# Patient Record
Sex: FEMALE | Race: WHITE | NOT HISPANIC OR LATINO | Employment: OTHER | URBAN - NONMETROPOLITAN AREA
[De-identification: names, ages, dates, MRNs, and addresses within clinical notes are randomized per-mention and may not be internally consistent; named-entity substitution may affect disease eponyms.]

---

## 2020-06-19 PROBLEM — E03.9 HYPOTHYROIDISM: Status: ACTIVE | Noted: 2020-06-19

## 2020-06-19 PROBLEM — G47.33 OSA (OBSTRUCTIVE SLEEP APNEA): Status: ACTIVE | Noted: 2020-06-19

## 2020-06-19 PROBLEM — M19.90 ARTHRITIS: Status: ACTIVE | Noted: 2020-06-19

## 2020-06-19 PROBLEM — R00.2 PALPITATIONS: Status: ACTIVE | Noted: 2020-06-19

## 2020-06-19 PROBLEM — I10 ESSENTIAL HYPERTENSION: Status: ACTIVE | Noted: 2020-06-19

## 2020-06-19 PROBLEM — E78.2 MIXED HYPERLIPIDEMIA: Status: ACTIVE | Noted: 2020-06-19

## 2020-06-19 PROBLEM — R06.02 SHORTNESS OF BREATH: Status: ACTIVE | Noted: 2020-06-19

## 2020-08-03 ENCOUNTER — OFFICE VISIT (OUTPATIENT)
Dept: CARDIOLOGY | Facility: CLINIC | Age: 75
End: 2020-08-03

## 2020-08-03 VITALS
HEART RATE: 75 BPM | DIASTOLIC BLOOD PRESSURE: 70 MMHG | OXYGEN SATURATION: 96 % | WEIGHT: 272 LBS | SYSTOLIC BLOOD PRESSURE: 137 MMHG | BODY MASS INDEX: 41.22 KG/M2 | HEIGHT: 68 IN

## 2020-08-03 DIAGNOSIS — G47.33 OSA (OBSTRUCTIVE SLEEP APNEA): ICD-10-CM

## 2020-08-03 DIAGNOSIS — R00.2 PALPITATIONS: ICD-10-CM

## 2020-08-03 DIAGNOSIS — E78.2 MIXED HYPERLIPIDEMIA: ICD-10-CM

## 2020-08-03 DIAGNOSIS — I10 ESSENTIAL HYPERTENSION: Primary | ICD-10-CM

## 2020-08-03 DIAGNOSIS — R06.02 SHORTNESS OF BREATH: ICD-10-CM

## 2020-08-03 PROCEDURE — 99213 OFFICE O/P EST LOW 20 MIN: CPT | Performed by: SPECIALIST

## 2020-08-03 RX ORDER — ATORVASTATIN CALCIUM 20 MG/1
20 TABLET, FILM COATED ORAL NIGHTLY
COMMUNITY

## 2020-08-03 RX ORDER — LEVOTHYROXINE SODIUM 0.05 MG/1
50 TABLET ORAL DAILY
COMMUNITY
End: 2022-09-19 | Stop reason: DRUGHIGH

## 2020-08-03 RX ORDER — UBIDECARENONE/VIT E/VIT E MIX 100-20-15
CAPSULE ORAL DAILY
COMMUNITY
End: 2022-11-30

## 2020-08-03 NOTE — PROGRESS NOTES
Subjective   Follow up, Hypertension  Snehal Cutler is a 74 y.o. female who presents to day for Establish Care (Here for f/u); Hyperlipidemia; Palpitations; Sleep Apnea; and Hypertension.    CHIEF COMPLIANT  Chief Complaint   Patient presents with   • Establish Care     Here for f/u   • Hyperlipidemia   • Palpitations   • Sleep Apnea   • Hypertension     Problem List:    1. [facility], 12-, EF 80-85%, left dominant, Nl. Coronaries  2. HTN  3. Hyperlipidemia  4. TERI, does not use machine. Followed by Dr. Holder.   5. Palpitations  6. Hypothyroidism        Problem List Items Addressed This Visit        Cardiovascular and Mediastinum    Palpitations    Essential hypertension - Primary    Mixed hyperlipidemia    Relevant Medications    atorvastatin (LIPITOR) 20 MG tablet       Respiratory    Shortness of breath    TERI (obstructive sleep apnea)          HPI  Doing well, but with recently started to have LE edema, mild shortness of breath, no chest pain, no palpitations, she is not using the CPAP                PRIOR MEDS  Current Outpatient Medications on File Prior to Visit   Medication Sig Dispense Refill   • amLODIPine (NORVASC) 10 MG tablet Take 10 mg by mouth Daily.     • atorvastatin (LIPITOR) 20 MG tablet Take 20 mg by mouth Every Night.     • CBD (cannabidiol) oral oil Take  by mouth Daily.     • doxazosin (CARDURA) 4 MG tablet Take 4 mg by mouth Every Night.     • hydroCHLOROthiazide (HYDRODIURIL) 25 MG tablet Take 25 mg by mouth Daily.     • levothyroxine (SYNTHROID, LEVOTHROID) 50 MCG tablet Take 50 mcg by mouth Daily.     • lisinopril (PRINIVIL,ZESTRIL) 40 MG tablet Take 40 mg by mouth Daily.     • meloxicam (MOBIC) 15 MG tablet Take 15 mg by mouth Daily.     • metFORMIN (GLUCOPHAGE) 500 MG tablet Take 500 mg by mouth Daily With Breakfast.     • metoprolol tartrate (LOPRESSOR) 100 MG tablet Take 100 mg by mouth 2 (Two) Times a Day.     • oxybutynin XL (DITROPAN-XL) 5 MG 24 hr tablet Take 5 mg by mouth Daily.      • cholecalciferol (VITAMIN D3) 25 MCG tablet Take  by mouth 2 (Two) Times a Day. Not taken for approx. A mo. Will be restarting     • Co-Enzyme Q10 100 MG capsule Take  by mouth Daily. Not taken for approx. A mo. Will be restarting     • Garlic 1000 MG capsule Take  by mouth Daily. Not taken for approx. A mo. Will be restarting     • MAGNESIUM CITRATE PO Take 250 mg by mouth Daily. Not taken for approx. A mo. Will be restarting     • [DISCONTINUED] Turmeric 500 MG capsule Take  by mouth Daily.       No current facility-administered medications on file prior to visit.        ALLERGIES  Morphine    HISTORY  Past Medical History:   Diagnosis Date   • Arthritis    • HTN (hypertension)    • Hyperlipidemia    • Hypothyroidism    • TERI (obstructive sleep apnea)     does not use machine   • Palpitation    • Shortness of breath        Social History     Socioeconomic History   • Marital status:      Spouse name: Not on file   • Number of children: Not on file   • Years of education: Not on file   • Highest education level: Not on file   Tobacco Use   • Smoking status: Never Smoker   • Smokeless tobacco: Never Used   Substance and Sexual Activity   • Alcohol use: Never     Frequency: Never   • Drug use: Never       Family History   Problem Relation Age of Onset   • Heart attack Mother    • Cancer Father        Review of Systems   Constitutional: Positive for fatigue (r/t inactivity ). Negative for activity change and appetite change.   HENT: Negative.  Negative for congestion.    Eyes: Positive for visual disturbance (glasses prn). Negative for discharge.   Respiratory: Negative.  Negative for apnea, cough, choking, chest tightness, shortness of breath, wheezing and stridor.    Cardiovascular: Positive for palpitations (occas.) and leg swelling. Negative for chest pain.   Gastrointestinal: Negative.  Negative for abdominal distention and abdominal pain.   Endocrine: Negative.  Negative for cold intolerance and heat  "intolerance.   Genitourinary: Negative.  Negative for frequency.   Musculoskeletal: Positive for arthralgias, gait problem (ambulates with rolling walker) and myalgias.   Skin: Negative.  Negative for color change and pallor.   Allergic/Immunologic: Negative.  Negative for immunocompromised state.   Neurological: Negative for facial asymmetry.   Hematological: Negative.  Does not bruise/bleed easily.   Psychiatric/Behavioral: Negative.  Negative for agitation and behavioral problems.       Objective     VITALS: /70 (BP Location: Left arm, Patient Position: Sitting)   Pulse 75   Ht 172.7 cm (68\")   Wt 123 kg (272 lb)   SpO2 96%   BMI 41.36 kg/m²     LABS:   Lab Results (most recent)     None          IMAGING:   No Images in the past 120 days found..    EXAM:  Physical Exam   Constitutional: She is oriented to person, place, and time. She appears well-developed and well-nourished.   HENT:   Head: Normocephalic and atraumatic.   Eyes: Pupils are equal, round, and reactive to light.   Neck: Neck supple. No JVD present. No thyromegaly present.   Cardiovascular: Normal rate, regular rhythm, normal heart sounds and intact distal pulses. Exam reveals no gallop and no friction rub.   No murmur heard.  Pulmonary/Chest: Effort normal and breath sounds normal. No stridor. No respiratory distress. She has no wheezes. She has no rales. She exhibits no tenderness.   Musculoskeletal: She exhibits no edema, tenderness or deformity.   Neurological: She is alert and oriented to person, place, and time. No cranial nerve deficit. Coordination normal.   Skin: Skin is warm and dry.   Psychiatric: She has a normal mood and affect.   Vitals reviewed.      Procedure   Procedures       Assessment/Plan    Diagnosis Plan   1. Essential hypertension     2. Mixed hyperlipidemia     3. Palpitations     4. TERI (obstructive sleep apnea)     5. Shortness of breath     1. Blood pressure is well controlled, will continue current " management  2. No labs, will get lipid prior to next visit  3. No further palpitations  4. New onset edema, and shortness of breath, will get echocardiogram to assess cardiac function  5. Not using CPAP and refuses to use it    No follow-ups on file.    Snehal was seen today for establish care, hyperlipidemia, palpitations, sleep apnea and hypertension.    Diagnoses and all orders for this visit:    Essential hypertension    Mixed hyperlipidemia    Palpitations    TERI (obstructive sleep apnea)    Shortness of breath                 MEDS ORDERED DURING VISIT:  No orders of the defined types were placed in this encounter.        This document has been electronically signed by Nabeel Turner MD  August 3, 2020 14:50

## 2020-09-16 ENCOUNTER — APPOINTMENT (OUTPATIENT)
Dept: CARDIOLOGY | Facility: HOSPITAL | Age: 75
End: 2020-09-16

## 2020-10-05 ENCOUNTER — TELEPHONE (OUTPATIENT)
Dept: CARDIOLOGY | Facility: CLINIC | Age: 75
End: 2020-10-05

## 2020-10-05 NOTE — TELEPHONE ENCOUNTER
PER DR. CASTELLANOS. PCP TO ADDR[facility] REFILLS. [facility] AWARE, TO RELAY TO PATIENT. PH,JUAN CARLOSN

## 2020-10-28 ENCOUNTER — APPOINTMENT (OUTPATIENT)
Dept: CARDIOLOGY | Facility: HOSPITAL | Age: 75
End: 2020-10-28

## 2022-09-19 ENCOUNTER — OFFICE VISIT (OUTPATIENT)
Dept: CARDIOLOGY | Facility: CLINIC | Age: 77
End: 2022-09-19

## 2022-09-19 VITALS
HEIGHT: 67 IN | OXYGEN SATURATION: 98 % | HEART RATE: 76 BPM | WEIGHT: 285.6 LBS | SYSTOLIC BLOOD PRESSURE: 148 MMHG | DIASTOLIC BLOOD PRESSURE: 76 MMHG | BODY MASS INDEX: 44.83 KG/M2

## 2022-09-19 DIAGNOSIS — E78.2 MIXED HYPERLIPIDEMIA: ICD-10-CM

## 2022-09-19 DIAGNOSIS — R00.2 PALPITATIONS: ICD-10-CM

## 2022-09-19 DIAGNOSIS — R60.0 BILATERAL LEG EDEMA: ICD-10-CM

## 2022-09-19 DIAGNOSIS — I10 ESSENTIAL HYPERTENSION: Primary | ICD-10-CM

## 2022-09-19 DIAGNOSIS — G47.33 OSA (OBSTRUCTIVE SLEEP APNEA): ICD-10-CM

## 2022-09-19 DIAGNOSIS — R06.02 SHORTNESS OF BREATH: ICD-10-CM

## 2022-09-19 DIAGNOSIS — R73.03 PREDIABETES: ICD-10-CM

## 2022-09-19 PROCEDURE — 99214 OFFICE O/P EST MOD 30 MIN: CPT | Performed by: SPECIALIST

## 2022-09-19 RX ORDER — LEVOTHYROXINE SODIUM 88 UG/1
TABLET ORAL DAILY
COMMUNITY
Start: 2022-08-24

## 2022-09-19 RX ORDER — HYDROCHLOROTHIAZIDE 50 MG/1
TABLET ORAL DAILY
COMMUNITY
Start: 2022-08-15

## 2022-09-19 NOTE — PROGRESS NOTES
Subjective   Follow up, hypertension  Snehal Cutler is a 76 y.o. female who presents to day for Follow-up (Here for 3 mo. F/u), Hyperlipidemia, Hypertension, Palpitations, Shortness of Breath, and Sleep Apnea.    CHIEF COMPLIANT  Chief Complaint   Patient presents with   • Follow-up     Here for 3 mo. F/u   • Hyperlipidemia   • Hypertension   • Palpitations   • Shortness of Breath   • Sleep Apnea     Problem List:     1. [facility], 12-, EF 80-85%, left dominant, Nl. Coronaries  2. HTN  3. Hyperlipidemia  4. TERI, does not use machine. Followed by Dr. Holder.   5. Palpitations  6. Hypothyroidism    Problem List Items Addressed This Visit        Cardiac and Vasculature    Palpitations    Essential hypertension - Primary    Relevant Medications    hydroCHLOROthiazide (HYDRODIURIL) 50 MG tablet    Mixed hyperlipidemia    Relevant Orders    Lipid Panel    Comprehensive Metabolic Panel       Endocrine and Metabolic    Prediabetes       Pulmonary and Pneumonias    Shortness of breath    Relevant Orders    Adult Transthoracic Echo Complete w/ Color, Spectral and Contrast if necessary per protocol       Sleep    TERI (obstructive sleep apnea)       Symptoms and Signs    Bilateral leg edema    Relevant Orders    Adult Transthoracic Echo Complete w/ Color, Spectral and Contrast if necessary per protocol          HPI    She has been using walker for walking and she is denying significant shortness of breath or chest pain or palpitations she is to have palpitations before but now for many months that have resolved but she does have worsening edema recently she is not using CPAP                PRIOR MEDS  Current Outpatient Medications on File Prior to Visit   Medication Sig Dispense Refill   • amLODIPine (NORVASC) 10 MG tablet Take 10 mg by mouth Daily.     • Ascorbic Acid (VITAMIN C PO) Take  by mouth Daily.     • atorvastatin (LIPITOR) 20 MG tablet Take 20 mg by mouth Every Night.     • cholecalciferol (VITAMIN D3) 25 MCG  tablet Take  by mouth 2 (Two) Times a Day.     • Co-Enzyme Q10 100 MG capsule Take  by mouth Daily.     • doxazosin (CARDURA) 4 MG tablet Take 4 mg by mouth Every Night.     • Garlic 1000 MG capsule Take  by mouth Daily.     • hydroCHLOROthiazide (HYDRODIURIL) 50 MG tablet Daily.     • levothyroxine (SYNTHROID, LEVOTHROID) 88 MCG tablet Daily.     • lisinopril (PRINIVIL,ZESTRIL) 40 MG tablet Take 40 mg by mouth Daily.     • MAGNESIUM CITRATE PO Take 250 mg by mouth Daily. Not taken for approx. A mo. Will be restarting     • meloxicam (MOBIC) 15 MG tablet Take 15 mg by mouth Daily.     • metoprolol tartrate (LOPRESSOR) 100 MG tablet Take 200 mg by mouth 2 (Two) Times a Day.     • Multiple Vitamins-Minerals (ZINC PO) Take  by mouth Daily.     • oxybutynin XL (DITROPAN-XL) 5 MG 24 hr tablet Take 5 mg by mouth Daily.     • [DISCONTINUED] CBD (cannabidiol) oral oil Take  by mouth Daily.     • [DISCONTINUED] hydroCHLOROthiazide (HYDRODIURIL) 25 MG tablet Take 25 mg by mouth Daily.     • [DISCONTINUED] levothyroxine (SYNTHROID, LEVOTHROID) 50 MCG tablet Take 50 mcg by mouth Daily.     • [DISCONTINUED] metFORMIN (GLUCOPHAGE) 500 MG tablet Take 500 mg by mouth Daily With Breakfast.       No current facility-administered medications on file prior to visit.       ALLERGIES  Morphine    HISTORY  Past Medical History:   Diagnosis Date   • Arthritis    • HTN (hypertension)    • Hyperlipidemia    • Hypothyroidism    • TERI (obstructive sleep apnea)     does not use machine   • Palpitation    • Shortness of breath        Social History     Socioeconomic History   • Marital status:    Tobacco Use   • Smoking status: Never Smoker   • Smokeless tobacco: Never Used   Substance and Sexual Activity   • Alcohol use: Never   • Drug use: Never       Family History   Problem Relation Age of Onset   • Heart attack Mother    • Cancer Father        Review of Systems   Constitutional: Negative.    HENT: Negative.    Eyes: Positive for  "visual disturbance (reading glasses).   Respiratory: Negative.    Cardiovascular: Positive for leg swelling. Negative for chest pain and palpitations.   Gastrointestinal: Negative.    Endocrine: Negative.    Genitourinary: Negative.    Musculoskeletal: Positive for arthralgias, gait problem (ambulates with rolling walker) and myalgias.   Skin: Negative.    Allergic/Immunologic: Negative.    Neurological: Negative for dizziness, syncope and light-headedness.   Hematological: Negative.    Psychiatric/Behavioral: Positive for sleep disturbance (off and on).        Depression off and on       Objective     VITALS: /76 (BP Location: Left arm, Patient Position: Sitting)   Pulse 76   Ht 170.2 cm (67\")   Wt 130 kg (285 lb 9.6 oz)   SpO2 98%   BMI 44.73 kg/m²     LABS:   Lab Results (most recent)     None          IMAGING:   No Images in the past 120 days found..    EXAM:  Physical Exam  Vitals reviewed.   Constitutional:       Appearance: She is well-developed.   HENT:      Head: Normocephalic and atraumatic.   Eyes:      Pupils: Pupils are equal, round, and reactive to light.   Neck:      Thyroid: No thyromegaly.      Vascular: No JVD.   Cardiovascular:      Rate and Rhythm: Normal rate and regular rhythm.      Heart sounds: Normal heart sounds. No murmur heard.    No friction rub. No gallop.      Comments: Bilateral leg edema  Pulmonary:      Effort: Pulmonary effort is normal. No respiratory distress.      Breath sounds: Normal breath sounds. No stridor. No wheezing or rales.   Chest:      Chest wall: No tenderness.   Musculoskeletal:         General: No tenderness or deformity.      Cervical back: Neck supple.   Skin:     General: Skin is warm and dry.   Neurological:      Mental Status: She is alert and oriented to person, place, and time.      Cranial Nerves: No cranial nerve deficit.      Coordination: Coordination normal.         Procedure   Procedures       Assessment & Plan     Diagnoses and all orders " for this visit:    1. Essential hypertension (Primary)    2. Mixed hyperlipidemia  -     Lipid Panel; Future  -     Comprehensive Metabolic Panel; Future    3. Palpitations    4. Shortness of breath  -     Adult Transthoracic Echo Complete w/ Color, Spectral and Contrast if necessary per protocol; Future    5. TERI (obstructive sleep apnea)    6. Bilateral leg edema  -     Adult Transthoracic Echo Complete w/ Color, Spectral and Contrast if necessary per protocol; Future    7. Prediabetes    1.  Her blood pressure is elevated here but she insists that her blood pressure at home is well controlled so will continue watching her for now  2.  She still having edema I have not seen her for almost 2 years she did not get an echocardiogram I will get an echocardiogram to assess her cardiac function  3.  I do not have any recent blood work but her labs 2 years ago showed hyperlipidemia as well as elevated hemoglobin A1c consistent with prediabetes I am going to repeat the labs prior to next visit  For patient she does not use a CPAP not rating it    Return in about 4 weeks (around 10/17/2022).      Advance Care Planning   ACP discussion was held with the patient during this visit. Patient has an advance directive (not in EMR), copy requested.             MEDS ORDERED DURING VISIT:  No orders of the defined types were placed in this encounter.      As always, Veronica Kellogg, NARGIS  I appreciate very much the opportunity to participate in the cardiovascular care of your patients. Please do not hesitate to call me with any questions with regards to Snehal Cutler evaluation and management.         This document has been electronically signed by Nabeel Turner MD  September 19, 2022 17:22 EDT

## 2022-10-07 ENCOUNTER — HOSPITAL ENCOUNTER (OUTPATIENT)
Dept: CARDIOLOGY | Facility: HOSPITAL | Age: 77
Discharge: HOME | End: 2022-10-07
Admitting: SPECIALIST

## 2022-10-07 DIAGNOSIS — R60.0 BILATERAL LEG EDEMA: ICD-10-CM

## 2022-10-07 DIAGNOSIS — E78.2 MIXED HYPERLIPIDEMIA: ICD-10-CM

## 2022-10-07 DIAGNOSIS — R06.02 SHORTNESS OF BREATH: ICD-10-CM

## 2022-10-07 PROCEDURE — 93306 TTE W/DOPPLER COMPLETE: CPT

## 2022-10-07 PROCEDURE — 93306 TTE W/DOPPLER COMPLETE: CPT | Performed by: SPECIALIST

## 2022-10-10 LAB
BH CV ECHO MEAS - ACS: 2.11 CM
BH CV ECHO MEAS - AO MAX PG: 7.5 MMHG
BH CV ECHO MEAS - AO MEAN PG: 4.6 MMHG
BH CV ECHO MEAS - AO ROOT DIAM: 3.5 CM
BH CV ECHO MEAS - AO V2 MAX: 137.1 CM/SEC
BH CV ECHO MEAS - AO V2 VTI: 36 CM
BH CV ECHO MEAS - EDV(CUBED): 60.2 ML
BH CV ECHO MEAS - EDV(MOD-SP4): 116 ML
BH CV ECHO MEAS - EF(MOD-SP4): 57.4 %
BH CV ECHO MEAS - EF_3D-VOL: 60 %
BH CV ECHO MEAS - ESV(CUBED): 15.4 ML
BH CV ECHO MEAS - ESV(MOD-SP4): 49.4 ML
BH CV ECHO MEAS - FS: 36.5 %
BH CV ECHO MEAS - IVS/LVPW: 1.23 CM
BH CV ECHO MEAS - IVSD: 1.36 CM
BH CV ECHO MEAS - LA DIMENSION: 4.2 CM
BH CV ECHO MEAS - LAT PEAK E' VEL: 5.9 CM/SEC
BH CV ECHO MEAS - LV DIASTOLIC VOL/BSA (35-75): 49.4 CM2
BH CV ECHO MEAS - LV MASS(C)D: 167.6 GRAMS
BH CV ECHO MEAS - LV SYSTOLIC VOL/BSA (12-30): 21 CM2
BH CV ECHO MEAS - LVIDD: 3.9 CM
BH CV ECHO MEAS - LVIDS: 2.49 CM
BH CV ECHO MEAS - LVOT AREA: 3.1 CM2
BH CV ECHO MEAS - LVOT DIAM: 2 CM
BH CV ECHO MEAS - LVPWD: 1.11 CM
BH CV ECHO MEAS - MED PEAK E' VEL: 6.4 CM/SEC
BH CV ECHO MEAS - MV A MAX VEL: 120 CM/SEC
BH CV ECHO MEAS - MV DEC SLOPE: 381.6 CM/SEC2
BH CV ECHO MEAS - MV E MAX VEL: 102 CM/SEC
BH CV ECHO MEAS - MV E/A: 0.85
BH CV ECHO MEAS - MV MAX PG: 6.2 MMHG
BH CV ECHO MEAS - MV MEAN PG: 3.1 MMHG
BH CV ECHO MEAS - MV P1/2T: 91.6 MSEC
BH CV ECHO MEAS - MV V2 VTI: 43.7 CM
BH CV ECHO MEAS - MVA(P1/2T): 2.4 CM2
BH CV ECHO MEAS - RAP SYSTOLE: 10 MMHG
BH CV ECHO MEAS - RVDD: 3 CM
BH CV ECHO MEAS - RVSP: 31.8 MMHG
BH CV ECHO MEAS - SI(MOD-SP4): 28.3 ML/M2
BH CV ECHO MEAS - SV(MOD-SP4): 66.6 ML
BH CV ECHO MEAS - TR MAX PG: 21.8 MMHG
BH CV ECHO MEAS - TR MAX VEL: 233.5 CM/SEC
BH CV ECHO MEASUREMENTS AVERAGE E/E' RATIO: 16.59
LEFT ATRIUM VOLUME INDEX: 30.5 ML/M2
MAXIMAL PREDICTED HEART RATE: 142 BPM
STRESS TARGET HR: 121 BPM

## 2022-10-12 ENCOUNTER — APPOINTMENT (OUTPATIENT)
Dept: CARDIOLOGY | Facility: HOSPITAL | Age: 77
End: 2022-10-12

## 2022-11-30 ENCOUNTER — OFFICE VISIT (OUTPATIENT)
Dept: CARDIOLOGY | Facility: CLINIC | Age: 77
End: 2022-11-30

## 2022-11-30 VITALS
DIASTOLIC BLOOD PRESSURE: 71 MMHG | SYSTOLIC BLOOD PRESSURE: 145 MMHG | HEIGHT: 67 IN | BODY MASS INDEX: 43.63 KG/M2 | HEART RATE: 69 BPM | WEIGHT: 278 LBS | OXYGEN SATURATION: 96 %

## 2022-11-30 DIAGNOSIS — R60.0 BILATERAL LEG EDEMA: ICD-10-CM

## 2022-11-30 DIAGNOSIS — E78.2 MIXED HYPERLIPIDEMIA: ICD-10-CM

## 2022-11-30 DIAGNOSIS — R06.02 SHORTNESS OF BREATH: ICD-10-CM

## 2022-11-30 DIAGNOSIS — G47.33 OSA (OBSTRUCTIVE SLEEP APNEA): ICD-10-CM

## 2022-11-30 DIAGNOSIS — R00.2 PALPITATIONS: ICD-10-CM

## 2022-11-30 DIAGNOSIS — I10 ESSENTIAL HYPERTENSION: Primary | ICD-10-CM

## 2022-11-30 PROCEDURE — 99214 OFFICE O/P EST MOD 30 MIN: CPT | Performed by: SPECIALIST

## 2022-11-30 NOTE — PROGRESS NOTES
Subjective   Follow up, hypertension  Snehal Cutler is a 76 y.o. female who presents to day for Follow-up (Here for 2  mo. F/u), Hyperlipidemia, Hypertension, Palpitations, Shortness of Breath, and Leg Swelling.    CHIEF COMPLIANT  Chief Complaint   Patient presents with   • Follow-up     Here for 2  mo. F/u   • Hyperlipidemia   • Hypertension   • Palpitations   • Shortness of Breath   • Leg Swelling     Problem List:     1. [facility], 12-, EF 80-85%, left dominant, Nl. Coronaries  2. HTN  3. Hyperlipidemia  4. TERI, does not use machine. Followed by Dr. Holder.   5. Palpitations  6. Hypothyroidism    Problem List Items Addressed This Visit        Cardiac and Vasculature    Palpitations    Essential hypertension - Primary    Mixed hyperlipidemia    Relevant Orders    Lipid Panel    Comprehensive Metabolic Panel       Pulmonary and Pneumonias    Shortness of breath       Sleep    TERI (obstructive sleep apnea)       Symptoms and Signs    Bilateral leg edema       HPI    She has been doing well she rarely gets palpitations specially if she is dehydrated otherwise she is denying any chest pain no shortness of breath she does have a mild lower extremity edema recently she has been craving for salt otherwise no new issues                  PRIOR MEDS  Current Outpatient Medications on File Prior to Visit   Medication Sig Dispense Refill   • amLODIPine (NORVASC) 10 MG tablet Take 10 mg by mouth Daily.     • Ascorbic Acid (VITAMIN C PO) Take  by mouth Daily.     • atorvastatin (LIPITOR) 20 MG tablet Take 20 mg by mouth Every Night.     • cholecalciferol (VITAMIN D3) 25 MCG tablet Take  by mouth 3 (Three) Times a Day.     • doxazosin (CARDURA) 4 MG tablet Take 4 mg by mouth Every Night.     • Garlic 1000 MG capsule Take  by mouth Daily.     • hydroCHLOROthiazide (HYDRODIURIL) 50 MG tablet Daily.     • levothyroxine (SYNTHROID, LEVOTHROID) 88 MCG tablet Daily.     • lisinopril (PRINIVIL,ZESTRIL) 40 MG tablet Take 40 mg by  "mouth Daily.     • MAGNESIUM CITRATE PO Take 250 mg by mouth Daily. Not taken for approx. A mo. Will be restarting     • meloxicam (MOBIC) 15 MG tablet Take 15 mg by mouth Daily.     • metoprolol tartrate (LOPRESSOR) 100 MG tablet Take 200 mg by mouth 2 (Two) Times a Day.     • Multiple Vitamins-Minerals (ZINC PO) Take  by mouth Daily.     • oxybutynin XL (DITROPAN-XL) 5 MG 24 hr tablet Take 5 mg by mouth Daily.     • [DISCONTINUED] Co-Enzyme Q10 100 MG capsule Take  by mouth Daily.       No current facility-administered medications on file prior to visit.       ALLERGIES  Morphine    HISTORY  Past Medical History:   Diagnosis Date   • Arthritis    • HTN (hypertension)    • Hyperlipidemia    • Hypothyroidism    • TERI (obstructive sleep apnea)     does not use machine   • Palpitation    • Shortness of breath        Social History     Socioeconomic History   • Marital status:    Tobacco Use   • Smoking status: Never   • Smokeless tobacco: Never   Substance and Sexual Activity   • Alcohol use: Never   • Drug use: Never       Family History   Problem Relation Age of Onset   • Heart attack Mother    • Cancer Father        Review of Systems   Constitutional: Negative.    HENT: Negative.    Eyes: Positive for visual disturbance (reading glasses).   Respiratory: Negative.    Cardiovascular: Positive for palpitations (with dehydration) and leg swelling. Negative for chest pain.   Gastrointestinal: Negative.    Endocrine: Negative.    Genitourinary: Negative.    Musculoskeletal: Positive for arthralgias, gait problem (ambulates with rolling walker) and myalgias.   Skin: Negative.    Allergic/Immunologic: Negative.    Neurological: Negative for dizziness, syncope and light-headedness.   Hematological: Negative.    Psychiatric/Behavioral: Positive for sleep disturbance.       Objective     VITALS: /71 (BP Location: Left arm, Patient Position: Sitting)   Pulse 69   Ht 170.2 cm (67.01\")   Wt 126 kg (278 lb)   " SpO2 96%   BMI 43.53 kg/m²     LABS:   Lab Results (most recent)     None          IMAGING:   No Images in the past 120 days found..    EXAM:  Physical Exam  Vitals reviewed.   Constitutional:       Appearance: She is well-developed.   HENT:      Head: Normocephalic and atraumatic.   Eyes:      Pupils: Pupils are equal, round, and reactive to light.   Neck:      Thyroid: No thyromegaly.      Vascular: No JVD.   Cardiovascular:      Rate and Rhythm: Normal rate and regular rhythm.      Heart sounds: Normal heart sounds. No murmur heard.    No friction rub. No gallop.      Comments: Trace edema  Pulmonary:      Effort: Pulmonary effort is normal. No respiratory distress.      Breath sounds: Normal breath sounds. No stridor. No wheezing or rales.   Chest:      Chest wall: No tenderness.   Musculoskeletal:         General: No tenderness or deformity.      Cervical back: Neck supple.   Skin:     General: Skin is warm and dry.   Neurological:      Mental Status: She is alert and oriented to person, place, and time.      Cranial Nerves: No cranial nerve deficit.      Coordination: Coordination normal.         Procedure   Procedures       Assessment & Plan     Diagnoses and all orders for this visit:    1. Essential hypertension (Primary)    2. Mixed hyperlipidemia  -     Lipid Panel; Future  -     Comprehensive Metabolic Panel; Future    3. Palpitations    4. Shortness of breath    5. TERI (obstructive sleep apnea)    6. Bilateral leg edema    1.  Her blood pressure is a touch high but she said that her blood pressure at home is well controlled about 130 or so systolic so we will continue current medications for now  2.  We discussed the results of the echocardiogram which showed normal systolic function but with diastolic dysfunction I advised her to eat a low-salt diet  3.  Unfortunately I do not have any lab work at all we will try to get labs prior to the next visit  4.  She could not rate the CPAP and she is not using  it  5.  We will try to get lipids myself before she comes next visit we will continue for now the atorvastatin  6.  Regarding the palpitation if is getting any worse we will do an arrhythmia work-up    Return in about 6 months (around 5/30/2023).      Advance Care Planning   ACP discussion was held with the patient during this visit. Patient does not have an advance directive, declines further assistance.             MEDS ORDERED DURING VISIT:  No orders of the defined types were placed in this encounter.      As always, Veronica Kellogg, APRN  I appreciate very much the opportunity to participate in the cardiovascular care of your patients. Please do not hesitate to call me with any questions with regards to Snehal Cutler evaluation and management.         This document has been electronically signed by Nabeel Turner MD  November 30, 2022 16:06 EST

## 2023-05-08 ENCOUNTER — TELEPHONE (OUTPATIENT)
Dept: CARDIOLOGY | Facility: CLINIC | Age: 78
End: 2023-05-08
Payer: MEDICARE

## 2023-05-08 NOTE — TELEPHONE ENCOUNTER
Caller: [facility]    Relationship: Provider    Best call back number: [phone]    What is the best time to reach you: ANYTIME    What was the call regarding:   FESTUS CRUZ [facility] WAS JUST CALLING IN TO SEE IF WE RECEIVED [facility] FROM THEIR OFFICE. SHE WAS NOT SURE THEY WE EVER SENT SO SHE SENT IT OVER ON 5/8/23 JUST TO BE SURE, SHE JUST WANTED TO MAKE OFFICE AWARE.      Do you require a callback: IF NEEDED

## 2023-05-23 ENCOUNTER — TELEPHONE (OUTPATIENT)
Dept: CARDIOLOGY | Facility: CLINIC | Age: 78
End: 2023-05-23
Payer: MEDICARE

## 2023-05-23 NOTE — TELEPHONE ENCOUNTER
Called pt and advised her that they are in her chart and he will go over them with her at her f/up. She expressed understanding.

## 2023-05-23 NOTE — TELEPHONE ENCOUNTER
Caller: Snehal Cutler    Relationship: Self    Best call back number: [phone]    What is the best time to reach you: ANY    Who are you requesting to speak with (clinical staff, provider,  specific staff member): CLINICAL    What was the call regarding: PT WAS CHAY CASTELLANOS EVER REC'D HER LABS FROM PCP JOHN RODRIGUEZ    Do you require a callback: YES

## 2023-06-16 ENCOUNTER — OFFICE VISIT (OUTPATIENT)
Dept: CARDIOLOGY | Facility: CLINIC | Age: 78
End: 2023-06-16
Payer: MEDICARE

## 2023-06-16 VITALS
OXYGEN SATURATION: 99 % | BODY MASS INDEX: 43.07 KG/M2 | HEART RATE: 69 BPM | SYSTOLIC BLOOD PRESSURE: 133 MMHG | HEIGHT: 67 IN | DIASTOLIC BLOOD PRESSURE: 67 MMHG | WEIGHT: 274.4 LBS

## 2023-06-16 DIAGNOSIS — R06.02 SHORTNESS OF BREATH: ICD-10-CM

## 2023-06-16 DIAGNOSIS — R73.03 PREDIABETES: ICD-10-CM

## 2023-06-16 DIAGNOSIS — I10 ESSENTIAL HYPERTENSION: Primary | ICD-10-CM

## 2023-06-16 DIAGNOSIS — R00.2 PALPITATIONS: ICD-10-CM

## 2023-06-16 DIAGNOSIS — G47.33 OSA (OBSTRUCTIVE SLEEP APNEA): ICD-10-CM

## 2023-06-16 DIAGNOSIS — R60.0 BILATERAL LEG EDEMA: ICD-10-CM

## 2023-06-16 DIAGNOSIS — E78.2 MIXED HYPERLIPIDEMIA: ICD-10-CM

## 2023-06-16 PROCEDURE — 3078F DIAST BP <80 MM HG: CPT | Performed by: SPECIALIST

## 2023-06-16 PROCEDURE — 3075F SYST BP GE 130 - 139MM HG: CPT | Performed by: SPECIALIST

## 2023-06-16 PROCEDURE — 99214 OFFICE O/P EST MOD 30 MIN: CPT | Performed by: SPECIALIST

## 2023-06-16 RX ORDER — AMLODIPINE BESYLATE 10 MG/1
10 TABLET ORAL DAILY
Qty: 90 TABLET | Refills: 1 | Status: SHIPPED | OUTPATIENT
Start: 2023-06-16

## 2023-06-16 RX ORDER — LISINOPRIL 40 MG/1
40 TABLET ORAL DAILY
Qty: 90 TABLET | Refills: 1 | Status: SHIPPED | OUTPATIENT
Start: 2023-06-16

## 2023-06-16 RX ORDER — HYDROCHLOROTHIAZIDE 50 MG/1
50 TABLET ORAL DAILY
Qty: 90 TABLET | Refills: 1 | Status: SHIPPED | OUTPATIENT
Start: 2023-06-16

## 2023-06-16 RX ORDER — METOPROLOL TARTRATE 100 MG/1
200 TABLET ORAL 2 TIMES DAILY
Qty: 180 TABLET | Refills: 1 | Status: SHIPPED | OUTPATIENT
Start: 2023-06-16

## 2023-06-16 RX ORDER — FUROSEMIDE 20 MG/1
20 TABLET ORAL DAILY
COMMUNITY
Start: 2023-05-03

## 2023-06-16 RX ORDER — ATORVASTATIN CALCIUM 40 MG/1
40 TABLET, FILM COATED ORAL NIGHTLY
Qty: 90 TABLET | Refills: 1 | Status: SHIPPED | OUTPATIENT
Start: 2023-06-16

## 2023-06-16 NOTE — PROGRESS NOTES
Subjective   Follow up, HTN  Snehal Cutler is a 77 y.o. female who presents to day for Follow-up (Here for 6 mo. F/u), Hyperlipidemia, Hypertension, Palpitations, and Sleep Apnea.    CHIEF COMPLIANT  Chief Complaint   Patient presents with    Follow-up     Here for 6 mo. F/u    Hyperlipidemia    Hypertension    Palpitations    Sleep Apnea        Problem List:     1. [facility], 12-, EF 80-85%, left dominant, Nl. Coronaries  2. HTN  3. Hyperlipidemia  4. TERI, does not use machine. Followed by Dr. Holder.   5. Palpitations  6. Hypothyroidism  Problem List Items Addressed This Visit          Cardiac and Vasculature    Palpitations    Essential hypertension - Primary    Relevant Medications    furosemide (LASIX) 20 MG tablet    amLODIPine (NORVASC) 10 MG tablet    hydroCHLOROthiazide (HYDRODIURIL) 50 MG tablet    lisinopril (PRINIVIL,ZESTRIL) 40 MG tablet    metoprolol tartrate (LOPRESSOR) 100 MG tablet    Mixed hyperlipidemia    Relevant Medications    atorvastatin (LIPITOR) 40 MG tablet    Other Relevant Orders    Lipid Panel    Comprehensive Metabolic Panel       Endocrine and Metabolic    Prediabetes    Relevant Orders    Hemoglobin A1c       Pulmonary and Pneumonias    Shortness of breath       Sleep    TERI (obstructive sleep apnea)       Symptoms and Signs    Bilateral leg edema       HPI  Been having trouble with both of her knees with arthritis and this makes her rather sedentary she is contemplating about seeing an orthopedic physician otherwise from a heart standpoint she has been doing fine with no significant shortness of breath no chest pain very rare palpitations she does have some mild lower extremity swelling and recently she is some diuretics with good results                    PRIOR MEDS  Current Outpatient Medications on File Prior to Visit   Medication Sig Dispense Refill    doxazosin (CARDURA) 4 MG tablet Take 1 tablet by mouth Every Night.      furosemide (LASIX) 20 MG tablet Take 1 tablet by  mouth Daily.      levothyroxine (SYNTHROID, LEVOTHROID) 88 MCG tablet Daily.      meloxicam (MOBIC) 15 MG tablet Take 1 tablet by mouth Daily.      oxybutynin XL (DITROPAN-XL) 5 MG 24 hr tablet Take 1 tablet by mouth Daily.      [DISCONTINUED] amLODIPine (NORVASC) 10 MG tablet Take 1 tablet by mouth Daily.      [DISCONTINUED] atorvastatin (LIPITOR) 20 MG tablet Take 1 tablet by mouth Every Night.      [DISCONTINUED] hydroCHLOROthiazide (HYDRODIURIL) 50 MG tablet Daily.      [DISCONTINUED] lisinopril (PRINIVIL,ZESTRIL) 40 MG tablet Take 1 tablet by mouth Daily.      [DISCONTINUED] metoprolol tartrate (LOPRESSOR) 100 MG tablet Take 2 tablets by mouth 2 (Two) Times a Day.      [DISCONTINUED] Ascorbic Acid (VITAMIN C PO) Take  by mouth Daily.      [DISCONTINUED] cholecalciferol (VITAMIN D3) 25 MCG tablet Take  by mouth 3 (Three) Times a Day.      [DISCONTINUED] Garlic 1000 MG capsule Take  by mouth Daily.      [DISCONTINUED] MAGNESIUM CITRATE PO Take 250 mg by mouth Daily. Not taken for approx. A mo. Will be restarting      [DISCONTINUED] Multiple Vitamins-Minerals (ZINC PO) Take  by mouth Daily.       No current facility-administered medications on file prior to visit.       ALLERGIES  Morphine    HISTORY  Past Medical History:   Diagnosis Date    Arthritis     HTN (hypertension)     Hyperlipidemia     Hypothyroidism     TERI (obstructive sleep apnea)     does not use machine    Palpitation     Shortness of breath        Social History     Socioeconomic History    Marital status:    Tobacco Use    Smoking status: Never    Smokeless tobacco: Never   Substance and Sexual Activity    Alcohol use: Never    Drug use: Never       Family History   Problem Relation Age of Onset    Heart attack Mother     Cancer Father        Review of Systems   Constitutional:  Positive for fatigue.   HENT: Negative.     Eyes:  Positive for visual disturbance (glasses prn).   Respiratory: Negative.     Cardiovascular:  Positive for  "palpitations (occas.) and leg swelling. Negative for chest pain.   Gastrointestinal: Negative.    Endocrine: Negative.    Genitourinary: Negative.    Musculoskeletal:  Positive for arthralgias, gait problem (ambulates with rolling walker) and myalgias.   Skin: Negative.    Allergic/Immunologic: Negative.    Neurological:  Negative for dizziness, syncope and light-headedness.   Hematological: Negative.    Psychiatric/Behavioral:  Positive for sleep disturbance.      Objective     VITALS: /67 (BP Location: Left arm, Patient Position: Sitting)   Pulse 69   Ht 170.2 cm (67.01\")   Wt 124 kg (274 lb 6.4 oz)   SpO2 99%   BMI 42.97 kg/m²     LABS:   Lab Results (most recent)       None            IMAGING:   No Images in the past 120 days found..    EXAM:  Physical Exam  Vitals reviewed.   Constitutional:       Appearance: She is well-developed.   HENT:      Head: Normocephalic and atraumatic.   Eyes:      Pupils: Pupils are equal, round, and reactive to light.   Neck:      Thyroid: No thyromegaly.      Vascular: No JVD.   Cardiovascular:      Rate and Rhythm: Normal rate and regular rhythm.      Heart sounds: Normal heart sounds. No murmur heard.    No friction rub. No gallop.   Pulmonary:      Effort: Pulmonary effort is normal. No respiratory distress.      Breath sounds: Normal breath sounds. No stridor. No wheezing or rales.   Chest:      Chest wall: No tenderness.   Musculoskeletal:         General: No tenderness or deformity.      Cervical back: Neck supple.   Skin:     General: Skin is warm and dry.   Neurological:      Mental Status: She is alert and oriented to person, place, and time.      Cranial Nerves: No cranial nerve deficit.      Coordination: Coordination normal.       Procedure   Procedures       Assessment & Plan     Diagnoses and all orders for this visit:    1. Essential hypertension (Primary)  -     amLODIPine (NORVASC) 10 MG tablet; Take 1 tablet by mouth Daily.  Dispense: 90 tablet; " Refill: 1  -     hydroCHLOROthiazide (HYDRODIURIL) 50 MG tablet; Take 1 tablet by mouth Daily.  Dispense: 90 tablet; Refill: 1  -     lisinopril (PRINIVIL,ZESTRIL) 40 MG tablet; Take 1 tablet by mouth Daily.  Dispense: 90 tablet; Refill: 1  -     metoprolol tartrate (LOPRESSOR) 100 MG tablet; Take 2 tablets by mouth 2 (Two) Times a Day.  Dispense: 180 tablet; Refill: 1    2. Mixed hyperlipidemia  -     atorvastatin (LIPITOR) 40 MG tablet; Take 1 tablet by mouth Every Night.  Dispense: 90 tablet; Refill: 1  -     Lipid Panel; Future  -     Comprehensive Metabolic Panel; Future    3. Palpitations    4. Shortness of breath    5. TERI (obstructive sleep apnea)    6. Bilateral leg edema    7. Prediabetes  -     Hemoglobin A1c; Future    1.  Her blood pressure is well controlled today we will continue with the current management  2.  We discussed the blood work and she has mildly elevated fasting glucose as well as elevated hemoglobin A1c consistent with prediabetes her LDL is a little bit suboptimal of 88 otherwise the rest of the labs were unremarkable going to increase the dose of atorvastatin to 40 mg/day being to try to get an optimal LDL also advised her to talk to her primary care physician possibly considering semaglutide  3.  She has very rare palpitations continue current management  She is having some edema mostly stasis edema there is no clinical volume overload advised her to keep her feet up when she is sitting and watching salt intake  4.  Again she could not tolerate CPAP and she is not using it    Return in about 6 months (around 12/16/2023).      Advance Care Planning   ACP discussion was held with the patient during this visit. Patient does not have an advance directive, declines further assistance.             MEDS ORDERED DURING VISIT:  New Medications Ordered This Visit   Medications    amLODIPine (NORVASC) 10 MG tablet     Sig: Take 1 tablet by mouth Daily.     Dispense:  90 tablet     Refill:  1     atorvastatin (LIPITOR) 40 MG tablet     Sig: Take 1 tablet by mouth Every Night.     Dispense:  90 tablet     Refill:  1    hydroCHLOROthiazide (HYDRODIURIL) 50 MG tablet     Sig: Take 1 tablet by mouth Daily.     Dispense:  90 tablet     Refill:  1    lisinopril (PRINIVIL,ZESTRIL) 40 MG tablet     Sig: Take 1 tablet by mouth Daily.     Dispense:  90 tablet     Refill:  1    metoprolol tartrate (LOPRESSOR) 100 MG tablet     Sig: Take 2 tablets by mouth 2 (Two) Times a Day.     Dispense:  180 tablet     Refill:  1       As always, Veronica Kellogg APRN  I appreciate very much the opportunity to participate in the cardiovascular care of your patients. Please do not hesitate to call me with any questions with regards to Snehal Cutler evaluation and management.         This document has been electronically signed by Nabeel Turner MD  June 16, 2023 11:01 EDT    This note is dictated utilizing voice recognition software.

## 2024-03-12 DIAGNOSIS — I10 ESSENTIAL HYPERTENSION: ICD-10-CM

## 2024-03-12 RX ORDER — HYDROCHLOROTHIAZIDE 50 MG/1
50 TABLET ORAL DAILY
Qty: 30 TABLET | Refills: 1 | Status: SHIPPED | OUTPATIENT
Start: 2024-03-12

## 2024-04-19 ENCOUNTER — OFFICE VISIT (OUTPATIENT)
Dept: CARDIOLOGY | Facility: CLINIC | Age: 79
End: 2024-04-19
Payer: MEDICARE

## 2024-04-19 VITALS
WEIGHT: 270.6 LBS | BODY MASS INDEX: 42.47 KG/M2 | SYSTOLIC BLOOD PRESSURE: 133 MMHG | DIASTOLIC BLOOD PRESSURE: 72 MMHG | HEIGHT: 67 IN | OXYGEN SATURATION: 97 % | HEART RATE: 66 BPM

## 2024-04-19 DIAGNOSIS — E78.2 MIXED HYPERLIPIDEMIA: ICD-10-CM

## 2024-04-19 DIAGNOSIS — G47.33 OSA (OBSTRUCTIVE SLEEP APNEA): ICD-10-CM

## 2024-04-19 DIAGNOSIS — R06.02 SHORTNESS OF BREATH: ICD-10-CM

## 2024-04-19 DIAGNOSIS — R00.2 PALPITATIONS: ICD-10-CM

## 2024-04-19 DIAGNOSIS — R60.0 BILATERAL LEG EDEMA: ICD-10-CM

## 2024-04-19 DIAGNOSIS — I10 ESSENTIAL HYPERTENSION: Primary | ICD-10-CM

## 2024-04-19 DIAGNOSIS — R73.03 PREDIABETES: ICD-10-CM

## 2024-04-19 PROCEDURE — 99214 OFFICE O/P EST MOD 30 MIN: CPT | Performed by: SPECIALIST

## 2024-04-19 PROCEDURE — 3078F DIAST BP <80 MM HG: CPT | Performed by: SPECIALIST

## 2024-04-19 PROCEDURE — 3075F SYST BP GE 130 - 139MM HG: CPT | Performed by: SPECIALIST

## 2024-04-19 RX ORDER — ATORVASTATIN CALCIUM 20 MG/1
1 TABLET, FILM COATED ORAL DAILY
COMMUNITY
End: 2024-04-19 | Stop reason: SDUPTHER

## 2024-04-19 RX ORDER — LISINOPRIL 40 MG/1
40 TABLET ORAL DAILY
Qty: 90 TABLET | Refills: 1 | Status: SHIPPED | OUTPATIENT
Start: 2024-04-19

## 2024-04-19 RX ORDER — ATORVASTATIN CALCIUM 20 MG/1
20 TABLET, FILM COATED ORAL DAILY
Qty: 90 TABLET | Refills: 1 | Status: SHIPPED | OUTPATIENT
Start: 2024-04-19

## 2024-04-19 RX ORDER — FUROSEMIDE 20 MG/1
20 TABLET ORAL DAILY
Qty: 90 TABLET | Refills: 1 | Status: SHIPPED | OUTPATIENT
Start: 2024-04-19

## 2024-04-19 RX ORDER — METOPROLOL TARTRATE 100 MG/1
200 TABLET ORAL 2 TIMES DAILY
Qty: 180 TABLET | Refills: 1 | Status: SHIPPED | OUTPATIENT
Start: 2024-04-19

## 2024-04-19 RX ORDER — AMLODIPINE BESYLATE 10 MG/1
10 TABLET ORAL DAILY
Qty: 90 TABLET | Refills: 1 | Status: SHIPPED | OUTPATIENT
Start: 2024-04-19

## 2024-04-19 NOTE — PROGRESS NOTES
Subjective   Follow up, HTN  Snehal Cutler is a 78 y.o. female who presents to day for Follow-up (Here for 6 mo. F/u), Hyperlipidemia, Hypertension, Palpitations, and Sleep Apnea.    CHIEF COMPLIANT  Chief Complaint   Patient presents with    Follow-up     Here for 6 mo. F/u    Hyperlipidemia    Hypertension    Palpitations    Sleep Apnea     Problem List:     1. [facility], 12-, EF 80-85%, left dominant, Nl. Coronaries  2. HTN  3. Hyperlipidemia  4. TERI, does not use machine. Followed by Dr. Holder.   5. Palpitations  6. Hypothyroidism    Problem List Items Addressed This Visit          Cardiac and Vasculature    Palpitations    Essential hypertension - Primary    Relevant Medications    amLODIPine (NORVASC) 10 MG tablet    furosemide (LASIX) 20 MG tablet    lisinopril (PRINIVIL,ZESTRIL) 40 MG tablet    metoprolol tartrate (LOPRESSOR) 100 MG tablet    Mixed hyperlipidemia    Relevant Medications    atorvastatin (LIPITOR) 20 MG tablet    Other Relevant Orders    Lipid Panel    Comprehensive Metabolic Panel       Endocrine and Metabolic    Prediabetes    Relevant Orders    Hemoglobin A1c       Pulmonary and Pneumonias    Shortness of breath       Sleep    TERI (obstructive sleep apnea)       Symptoms and Signs    Bilateral leg edema       HPI    She has been doing pretty well she is denying any new symptoms no chest pain, no shortness of breath no palpitations at all, she said that she has been taking here diuretics only intermittently and when the weather gets little bit warmer now she gets a little bit of more lower extremity edema so she says she is going to start taking the more regularly                    PRIOR MEDS  Current Outpatient Medications on File Prior to Visit   Medication Sig Dispense Refill    Cholecalciferol (VITAMIN D-3 PO) Take  by mouth Daily.      CO ENZYME Q-10 PO Take  by mouth Daily.      doxazosin (CARDURA) 4 MG tablet Take 1 tablet by mouth Every Night.      GARLIC PO Take  by mouth Daily.       levothyroxine (SYNTHROID, LEVOTHROID) 88 MCG tablet Daily.      MAGNESIUM PO Take  by mouth Daily.      meloxicam (MOBIC) 15 MG tablet Take 1 tablet by mouth Daily.      Multiple Vitamins-Minerals (ZINC PO) Take  by mouth Daily.      Omega-3 Fatty Acids (OMEGA 3 PO) Take  by mouth Daily.      oxybutynin XL (DITROPAN-XL) 5 MG 24 hr tablet Take 1 tablet by mouth 2 (Two) Times a Day.      [DISCONTINUED] amLODIPine (NORVASC) 10 MG tablet Take 1 tablet by mouth Daily. 90 tablet 1    [DISCONTINUED] atorvastatin (LIPITOR) 20 MG tablet Take 1 tablet by mouth Daily.      [DISCONTINUED] lisinopril (PRINIVIL,ZESTRIL) 40 MG tablet Take 1 tablet by mouth Daily. 90 tablet 1    [DISCONTINUED] metoprolol tartrate (LOPRESSOR) 100 MG tablet Take 2 tablets by mouth 2 (Two) Times a Day. 180 tablet 1    [DISCONTINUED] atorvastatin (LIPITOR) 40 MG tablet Take 1 tablet by mouth Every Night. 90 tablet 1    [DISCONTINUED] furosemide (LASIX) 20 MG tablet Take 1 tablet by mouth Daily. (Patient not taking: Reported on 4/19/2024)      [DISCONTINUED] hydroCHLOROthiazide 50 MG tablet TAKE 1 TABLET DAILY (Patient not taking: Reported on 4/19/2024) 30 tablet 1     No current facility-administered medications on file prior to visit.       ALLERGIES  Morphine    HISTORY  Past Medical History:   Diagnosis Date    Arthritis     HTN (hypertension)     Hyperlipidemia     Hypothyroidism     TERI (obstructive sleep apnea)     does not use machine    Palpitation     Shortness of breath        Social History     Socioeconomic History    Marital status:    Tobacco Use    Smoking status: Never    Smokeless tobacco: Never   Substance and Sexual Activity    Alcohol use: Never    Drug use: Never       Family History   Problem Relation Age of Onset    Heart attack Mother     Cancer Father        Review of Systems   Constitutional: Negative.    HENT: Negative.     Eyes:  Positive for visual disturbance (reading glasses).   Respiratory: Negative.    "  Cardiovascular:  Positive for leg swelling. Negative for chest pain and palpitations.   Gastrointestinal: Negative.  Positive for constipation.   Endocrine: Negative.    Genitourinary: Negative.    Musculoskeletal:  Positive for arthralgias, gait problem (ambulates with rolling walker) and myalgias.   Skin: Negative.    Allergic/Immunologic: Negative.    Neurological: Negative.    Hematological: Negative.    Psychiatric/Behavioral: Negative.         Objective     VITALS: /72 (BP Location: Left arm, Patient Position: Sitting)   Pulse 66   Ht 170.2 cm (67.01\")   Wt 123 kg (270 lb 9.6 oz)   SpO2 97%   BMI 42.37 kg/m²     LABS:   Lab Results (most recent)       None            IMAGING:   No Images in the past 120 days found..    EXAM:  Physical Exam  Cardiovascular:      Comments: Trace lower extremity edema        Procedure   Procedures        Assessment & Plan     Diagnoses and all orders for this visit:    1. Essential hypertension (Primary)  -     amLODIPine (NORVASC) 10 MG tablet; Take 1 tablet by mouth Daily.  Dispense: 90 tablet; Refill: 1  -     furosemide (LASIX) 20 MG tablet; Take 1 tablet by mouth Daily.  Dispense: 90 tablet; Refill: 1  -     lisinopril (PRINIVIL,ZESTRIL) 40 MG tablet; Take 1 tablet by mouth Daily.  Dispense: 90 tablet; Refill: 1  -     metoprolol tartrate (LOPRESSOR) 100 MG tablet; Take 2 tablets by mouth 2 (Two) Times a Day.  Dispense: 180 tablet; Refill: 1    2. Mixed hyperlipidemia  -     atorvastatin (LIPITOR) 20 MG tablet; Take 1 tablet by mouth Daily.  Dispense: 90 tablet; Refill: 1  -     Lipid Panel; Future  -     Comprehensive Metabolic Panel; Future    3. Palpitations    4. Shortness of breath    5. TERI (obstructive sleep apnea)    6. Prediabetes  -     Hemoglobin A1c; Future    7. Bilateral leg edema    1.  Her blood pressure is well-controlled we will continue the current management  2.  I reviewed the labs from 4 March from her primary care physician that showed " elevated elevated hemoglobin A1c 5.9 with fasting glucose of 103 otherwise CMP and CBC both with were unremarkable we discussed also the result of the lipid profile which showed LDL of 81 HDL 44 and triglycerides 138 thyroid functions unremarkable we talked about cutting down the carbohydrate intake and exercising  3.  She had no recent palpitations at all  4.  At her level of activity she denies any shortness of breath we will continue current management  5.  She could never tolerate the CPAP and she is not using it  6.  She has slight lower extremity edema now she is not going to use of the diuretics more often    Return in about 6 months (around 10/19/2024).      Advance Care Planning   ACP discussion was held with the patient during this visit. Patient does not have an advance directive, declines further assistance.             MEDS ORDERED DURING VISIT:  New Medications Ordered This Visit   Medications    amLODIPine (NORVASC) 10 MG tablet     Sig: Take 1 tablet by mouth Daily.     Dispense:  90 tablet     Refill:  1    atorvastatin (LIPITOR) 20 MG tablet     Sig: Take 1 tablet by mouth Daily.     Dispense:  90 tablet     Refill:  1    furosemide (LASIX) 20 MG tablet     Sig: Take 1 tablet by mouth Daily.     Dispense:  90 tablet     Refill:  1    lisinopril (PRINIVIL,ZESTRIL) 40 MG tablet     Sig: Take 1 tablet by mouth Daily.     Dispense:  90 tablet     Refill:  1    metoprolol tartrate (LOPRESSOR) 100 MG tablet     Sig: Take 2 tablets by mouth 2 (Two) Times a Day.     Dispense:  180 tablet     Refill:  1       As always, Veronica Kellogg, NARGIS  I appreciate very much the opportunity to participate in the cardiovascular care of your patients. Please do not hesitate to call me with any questions with regards to Snehal Cutler evaluation and management.         This document has been electronically signed by Nabeel Turner MD  April 19, 2024 10:40 EDT    This note is dictated utilizing voice recognition software.

## 2024-05-14 DIAGNOSIS — I10 ESSENTIAL HYPERTENSION: ICD-10-CM

## 2024-05-14 RX ORDER — LISINOPRIL 40 MG/1
40 TABLET ORAL DAILY
Qty: 30 TABLET | Refills: 1 | Status: SHIPPED | OUTPATIENT
Start: 2024-05-14

## 2024-06-11 DIAGNOSIS — I10 ESSENTIAL HYPERTENSION: ICD-10-CM

## 2024-06-11 RX ORDER — HYDROCHLOROTHIAZIDE 50 MG/1
50 TABLET ORAL DAILY
Qty: 30 TABLET | Refills: 1 | OUTPATIENT
Start: 2024-06-11

## 2024-07-10 DIAGNOSIS — I10 ESSENTIAL HYPERTENSION: ICD-10-CM

## 2024-07-10 RX ORDER — LISINOPRIL 40 MG/1
40 TABLET ORAL DAILY
Qty: 30 TABLET | Refills: 1 | Status: SHIPPED | OUTPATIENT
Start: 2024-07-10

## 2024-09-06 DIAGNOSIS — I10 ESSENTIAL HYPERTENSION: ICD-10-CM

## 2024-09-06 RX ORDER — METOPROLOL TARTRATE 100 MG
TABLET ORAL
Qty: 360 TABLET | Refills: 1 | Status: SHIPPED | OUTPATIENT
Start: 2024-09-06

## 2024-09-06 RX ORDER — LISINOPRIL 40 MG/1
40 TABLET ORAL DAILY
Qty: 90 TABLET | Refills: 1 | Status: SHIPPED | OUTPATIENT
Start: 2024-09-06

## 2024-09-06 NOTE — TELEPHONE ENCOUNTER
Name from pharmacy: LISINOPRIL 40MG TABLET 40 Tablet         Will file in chart as: lisinopril (PRINIVIL,ZESTRIL) 40 MG tablet    Sig: TAKE 1 TABLET ONCE DAILY    Disp: 30 tablet    Refills: 1    Start: 9/6/2024    Class: Normal    For: Essential hypertension    Last ordered: 1 month ago (7/10/2024) by Nabeel Turner MD    Last refill: 9/6/2024    Rx #: [id number]    ACE Inhibitors Protocol [id number]/2024 01:48 PM   Protocol Details Normal serum potassium in past 12 months    Most recent eGFR is above 30 in the past 12 months.    No active pregnancy on record    Blood pressure on record    No positive pregnancy test in past 12 months    Patient is not on Entresto    Patient is not on an ARB    Recent or future visit with authorizing provider

## 2024-11-01 ENCOUNTER — OFFICE VISIT (OUTPATIENT)
Dept: CARDIOLOGY | Facility: CLINIC | Age: 79
End: 2024-11-01
Payer: MEDICARE

## 2024-11-01 VITALS
SYSTOLIC BLOOD PRESSURE: 158 MMHG | DIASTOLIC BLOOD PRESSURE: 71 MMHG | HEIGHT: 67 IN | HEART RATE: 68 BPM | OXYGEN SATURATION: 96 % | WEIGHT: 269.6 LBS | BODY MASS INDEX: 42.31 KG/M2

## 2024-11-01 DIAGNOSIS — E78.2 MIXED HYPERLIPIDEMIA: ICD-10-CM

## 2024-11-01 DIAGNOSIS — R00.2 PALPITATIONS: ICD-10-CM

## 2024-11-01 DIAGNOSIS — R06.02 SHORTNESS OF BREATH: ICD-10-CM

## 2024-11-01 DIAGNOSIS — I10 ESSENTIAL HYPERTENSION: Primary | ICD-10-CM

## 2024-11-01 DIAGNOSIS — G47.33 OSA (OBSTRUCTIVE SLEEP APNEA): ICD-10-CM

## 2024-11-01 PROCEDURE — 3078F DIAST BP <80 MM HG: CPT | Performed by: SPECIALIST

## 2024-11-01 PROCEDURE — 3077F SYST BP >= 140 MM HG: CPT | Performed by: SPECIALIST

## 2024-11-01 PROCEDURE — 99214 OFFICE O/P EST MOD 30 MIN: CPT | Performed by: SPECIALIST

## 2024-11-01 RX ORDER — METOPROLOL TARTRATE 100 MG/1
100 TABLET ORAL 2 TIMES DAILY
Qty: 360 TABLET | Refills: 1 | Status: SHIPPED | OUTPATIENT
Start: 2024-11-01

## 2024-11-01 RX ORDER — HYDROCHLOROTHIAZIDE 25 MG/1
25 TABLET ORAL DAILY
Qty: 90 TABLET | Refills: 3 | Status: SHIPPED | OUTPATIENT
Start: 2024-11-01

## 2024-11-01 RX ORDER — ATORVASTATIN CALCIUM 20 MG/1
20 TABLET, FILM COATED ORAL DAILY
Qty: 90 TABLET | Refills: 1 | Status: SHIPPED | OUTPATIENT
Start: 2024-11-01

## 2024-11-01 RX ORDER — PREDNISOLONE ACETATE 10 MG/ML
SUSPENSION/ DROPS OPHTHALMIC
COMMUNITY
Start: 2024-10-01

## 2024-11-01 RX ORDER — LISINOPRIL 40 MG/1
40 TABLET ORAL DAILY
Qty: 90 TABLET | Refills: 1 | Status: SHIPPED | OUTPATIENT
Start: 2024-11-01

## 2024-11-01 RX ORDER — AMLODIPINE BESYLATE 10 MG/1
10 TABLET ORAL DAILY
Qty: 90 TABLET | Refills: 1 | Status: SHIPPED | OUTPATIENT
Start: 2024-11-01

## 2024-11-01 NOTE — PROGRESS NOTES
Subjective   Follow up, HTN  Snehal Cutler is a 78 y.o. female who presents to day for Follow-up (Here for 6 mo. F/u), Hyperlipidemia, Hypertension, Palpitations, Sleep Apnea, and Shortness of Breath.    CHIEF COMPLIANT  Chief Complaint   Patient presents with    Follow-up     Here for 6 mo. F/u    Hyperlipidemia    Hypertension    Palpitations    Sleep Apnea    Shortness of Breath     Problem List:     1. [facility], 12-, EF 80-85%, left dominant, Nl. Coronaries  2. HTN  3. Hyperlipidemia  4. TERI, does not use machine. Followed by Dr. Holder.   5. Palpitations  6. Hypothyroidism    Problem List Items Addressed This Visit          Cardiac and Vasculature    Palpitations    Essential hypertension - Primary    Relevant Medications    hydroCHLOROthiazide 25 MG tablet    amLODIPine (NORVASC) 10 MG tablet    lisinopril (PRINIVIL,ZESTRIL) 40 MG tablet    metoprolol tartrate (LOPRESSOR) 100 MG tablet    Mixed hyperlipidemia    Relevant Medications    atorvastatin (LIPITOR) 20 MG tablet    Other Relevant Orders    Lipid Panel    Comprehensive Metabolic Panel       Pulmonary and Pneumonias    Shortness of breath       Sleep    TERI (obstructive sleep apnea)       HPI  She has been doing fine except she has been elevated under stress recently, recently she had cataract surgery and during the surgery it was noted that her blood pressure is a little bit elevated, otherwise she has been doing fine with no chest pain no shortness of breath no palpitations she gets intermittent edema but once whenever she gets edema she takes an extra water pill and usually edema is gone                      PRIOR MEDS  Current Outpatient Medications on File Prior to Visit   Medication Sig Dispense Refill    doxazosin (CARDURA) 4 MG tablet Take 1 tablet by mouth Every Night.      levothyroxine (SYNTHROID, LEVOTHROID) 88 MCG tablet Daily.      meloxicam (MOBIC) 15 MG tablet Take 1 tablet by mouth Daily.      oxybutynin XL (DITROPAN-XL) 5 MG 24 hr  tablet Take 1 tablet by mouth 2 (Two) Times a Day.      prednisoLONE acetate (PRED FORTE) 1 % ophthalmic suspension AFTER SURGERY USE 1 DROP EVERY 2 HOURS TIL BED. DAY 2 USE 1 DROP 4 TIMES A DAY FOR 14 DAYS, THEN 2 TIMES A DAY FOR 14      [DISCONTINUED] amLODIPine (NORVASC) 10 MG tablet Take 1 tablet by mouth Daily. 90 tablet 1    [DISCONTINUED] atorvastatin (LIPITOR) 20 MG tablet Take 1 tablet by mouth Daily. 90 tablet 1    [DISCONTINUED] furosemide (LASIX) 20 MG tablet Take 1 tablet by mouth Daily. (Patient taking differently: Take 1 tablet by mouth Daily As Needed.) 90 tablet 1    [DISCONTINUED] lisinopril (PRINIVIL,ZESTRIL) 40 MG tablet TAKE 1 TABLET ONCE DAILY 90 tablet 1    [DISCONTINUED] metoprolol tartrate (LOPRESSOR) 100 MG tablet TAKE 2 TABLET(S) TWICE DAILY 360 tablet 1    [DISCONTINUED] Cholecalciferol (VITAMIN D-3 PO) Take  by mouth Daily.      [DISCONTINUED] CO ENZYME Q-10 PO Take  by mouth Daily.      [DISCONTINUED] GARLIC PO Take  by mouth Daily.      [DISCONTINUED] MAGNESIUM PO Take  by mouth Daily.      [DISCONTINUED] Multiple Vitamins-Minerals (ZINC PO) Take  by mouth Daily.      [DISCONTINUED] Omega-3 Fatty Acids (OMEGA 3 PO) Take  by mouth Daily.       No current facility-administered medications on file prior to visit.       ALLERGIES  Morphine    HISTORY  Past Medical History:   Diagnosis Date    Arthritis     HTN (hypertension)     Hyperlipidemia     Hypothyroidism     TERI (obstructive sleep apnea)     does not use machine    Palpitation     Shortness of breath        Social History     Socioeconomic History    Marital status:    Tobacco Use    Smoking status: Never    Smokeless tobacco: Never   Substance and Sexual Activity    Alcohol use: Never    Drug use: Never       Family History   Problem Relation Age of Onset    Heart attack Mother     Cancer Father        Review of Systems   Constitutional:  Positive for fatigue (easily).   HENT: Negative.     Eyes:  Positive for visual  "disturbance.   Respiratory: Negative.     Cardiovascular:  Positive for leg swelling (mildly occas.). Negative for chest pain and palpitations.   Gastrointestinal: Negative.    Endocrine: Negative.    Genitourinary: Negative.    Musculoskeletal:  Positive for arthralgias, gait problem (ambulates with rolling walker) and myalgias.   Skin: Negative.    Allergic/Immunologic: Negative.    Neurological:  Negative for dizziness, syncope and light-headedness.   Hematological: Negative.    Psychiatric/Behavioral: Negative.         Objective     VITALS: /71 (BP Location: Right arm, Patient Position: Sitting)   Pulse 68   Ht 170.2 cm (67.01\")   Wt 122 kg (269 lb 9.6 oz)   SpO2 96%   BMI 42.22 kg/m²     LABS:   Lab Results (most recent)       None            IMAGING:   No Images in the past 120 days found..    EXAM:  Physical Exam  Vitals reviewed.   Constitutional:       Appearance: She is well-developed.   HENT:      Head: Normocephalic and atraumatic.   Eyes:      Pupils: Pupils are equal, round, and reactive to light.   Neck:      Thyroid: No thyromegaly.      Vascular: No JVD.   Cardiovascular:      Rate and Rhythm: Normal rate and regular rhythm.      Heart sounds: Normal heart sounds. No murmur heard.     No friction rub. No gallop.   Pulmonary:      Effort: Pulmonary effort is normal. No respiratory distress.      Breath sounds: Normal breath sounds. No stridor. No wheezing or rales.   Chest:      Chest wall: No tenderness.   Musculoskeletal:         General: No tenderness or deformity.      Cervical back: Neck supple.   Skin:     General: Skin is warm and dry.   Neurological:      Mental Status: She is alert and oriented to person, place, and time.      Cranial Nerves: No cranial nerve deficit.      Coordination: Coordination normal.         Procedure   Procedures        Assessment & Plan     Diagnoses and all orders for this visit:    1. Essential hypertension (Primary)  -     amLODIPine (NORVASC) 10 MG " tablet; Take 1 tablet by mouth Daily.  Dispense: 90 tablet; Refill: 1  -     lisinopril (PRINIVIL,ZESTRIL) 40 MG tablet; Take 1 tablet by mouth Daily.  Dispense: 90 tablet; Refill: 1  -     metoprolol tartrate (LOPRESSOR) 100 MG tablet; Take 1 tablet by mouth 2 (Two) Times a Day.  Dispense: 360 tablet; Refill: 1    2. Mixed hyperlipidemia  -     atorvastatin (LIPITOR) 20 MG tablet; Take 1 tablet by mouth Daily.  Dispense: 90 tablet; Refill: 1  -     Lipid Panel; Future  -     Comprehensive Metabolic Panel; Future    3. Palpitations    4. Shortness of breath    5. TERI (obstructive sleep apnea)    Other orders  -     hydroCHLOROthiazide 25 MG tablet; Take 1 tablet by mouth Daily.  Dispense: 90 tablet; Refill: 3    1.  Her blood pressure is elevated as she is taking only the furosemide as a as needed medications I am going to stop this I am going to add HCTZ 25 mg daily hopefully this will help with the edema as well as the blood pressure  2.  Unfortunate do not have any recent blood work I will try to get labs prior to her next visit  3.  Will continue with the statin for now  4.  No recent shortness of breath  5.  No recent palpitations  6.  She could not tolerate the CPAP and she has never been able to use it    Return in about 3 months (around 2/1/2025).      Advance Care Planning   ACP discussion was held with the patient during this visit. Patient does not have an advance directive, declines further assistance.             MEDS ORDERED DURING VISIT:  New Medications Ordered This Visit   Medications    hydroCHLOROthiazide 25 MG tablet     Sig: Take 1 tablet by mouth Daily.     Dispense:  90 tablet     Refill:  3    amLODIPine (NORVASC) 10 MG tablet     Sig: Take 1 tablet by mouth Daily.     Dispense:  90 tablet     Refill:  1    atorvastatin (LIPITOR) 20 MG tablet     Sig: Take 1 tablet by mouth Daily.     Dispense:  90 tablet     Refill:  1    lisinopril (PRINIVIL,ZESTRIL) 40 MG tablet     Sig: Take 1 tablet by  mouth Daily.     Dispense:  90 tablet     Refill:  1    metoprolol tartrate (LOPRESSOR) 100 MG tablet     Sig: Take 1 tablet by mouth 2 (Two) Times a Day.     Dispense:  360 tablet     Refill:  1       As always, Veronica Kellogg APRN  I appreciate very much the opportunity to participate in the cardiovascular care of your patients. Please do not hesitate to call me with any questions with regards to Snehal Cutler evaluation and management.         This document has been electronically signed by Nabeel Turner MD  November 1, 2024 09:48 EDT    This note is dictated utilizing voice recognition software.

## 2024-11-04 ENCOUNTER — LAB (OUTPATIENT)
Dept: LAB | Facility: HOSPITAL | Age: 79
End: 2024-11-04
Payer: MEDICARE

## 2024-11-04 DIAGNOSIS — E78.2 MIXED HYPERLIPIDEMIA: ICD-10-CM

## 2024-11-04 DIAGNOSIS — R73.03 PREDIABETES: ICD-10-CM

## 2024-11-04 LAB
ALBUMIN SERPL-MCNC: 4.3 G/DL (ref 3.5–5.2)
ALBUMIN/GLOB SERPL: 1.2 G/DL
ALP SERPL-CCNC: 120 U/L (ref 39–117)
ALT SERPL W P-5'-P-CCNC: 19 U/L (ref 1–33)
ANION GAP SERPL CALCULATED.3IONS-SCNC: 11.2 MMOL/L (ref 5–15)
AST SERPL-CCNC: 23 U/L (ref 1–32)
BILIRUB SERPL-MCNC: 0.6 MG/DL (ref 0–1.2)
BUN SERPL-MCNC: 19 MG/DL (ref 8–23)
BUN/CREAT SERPL: 25 (ref 7–25)
CALCIUM SPEC-SCNC: 9.5 MG/DL (ref 8.6–10.5)
CHLORIDE SERPL-SCNC: 101 MMOL/L (ref 98–107)
CHOLEST SERPL-MCNC: 170 MG/DL (ref 0–200)
CO2 SERPL-SCNC: 27.8 MMOL/L (ref 22–29)
CREAT SERPL-MCNC: 0.76 MG/DL (ref 0.57–1)
EGFRCR SERPLBLD CKD-EPI 2021: 79.3 ML/MIN/1.73
GLOBULIN UR ELPH-MCNC: 3.5 GM/DL
GLUCOSE SERPL-MCNC: 107 MG/DL (ref 65–99)
HBA1C MFR BLD: 5.9 % (ref 4.8–5.6)
HDLC SERPL-MCNC: 48 MG/DL (ref 40–60)
LDLC SERPL CALC-MCNC: 99 MG/DL (ref 0–100)
LDLC/HDLC SERPL: 2.01 {RATIO}
POTASSIUM SERPL-SCNC: 4.5 MMOL/L (ref 3.5–5.2)
PROT SERPL-MCNC: 7.8 G/DL (ref 6–8.5)
SODIUM SERPL-SCNC: 140 MMOL/L (ref 136–145)
TRIGL SERPL-MCNC: 128 MG/DL (ref 0–150)
VLDLC SERPL-MCNC: 23 MG/DL (ref 5–40)

## 2024-11-04 PROCEDURE — 80053 COMPREHEN METABOLIC PANEL: CPT

## 2024-11-04 PROCEDURE — 36415 COLL VENOUS BLD VENIPUNCTURE: CPT

## 2024-11-04 PROCEDURE — 80061 LIPID PANEL: CPT

## 2024-11-04 PROCEDURE — 83036 HEMOGLOBIN GLYCOSYLATED A1C: CPT

## 2024-11-06 ENCOUNTER — TELEPHONE (OUTPATIENT)
Dept: CARDIOLOGY | Facility: CLINIC | Age: 79
End: 2024-11-06
Payer: MEDICARE

## 2024-11-06 DIAGNOSIS — I10 ESSENTIAL HYPERTENSION: ICD-10-CM

## 2024-11-06 RX ORDER — HYDROCHLOROTHIAZIDE 25 MG/1
25 TABLET ORAL DAILY
Qty: 90 TABLET | Refills: 3 | Status: SHIPPED | OUTPATIENT
Start: 2024-11-06

## 2024-11-06 RX ORDER — AMLODIPINE BESYLATE 10 MG/1
10 TABLET ORAL DAILY
Qty: 30 TABLET | Refills: 1 | Status: SHIPPED | OUTPATIENT
Start: 2024-11-06

## 2024-12-06 DIAGNOSIS — E78.2 MIXED HYPERLIPIDEMIA: ICD-10-CM

## 2024-12-06 RX ORDER — ATORVASTATIN CALCIUM 20 MG/1
20 TABLET, FILM COATED ORAL DAILY
Qty: 90 TABLET | Refills: 3 | Status: SHIPPED | OUTPATIENT
Start: 2024-12-06

## 2025-01-06 DIAGNOSIS — I10 ESSENTIAL HYPERTENSION: ICD-10-CM

## 2025-01-06 RX ORDER — AMLODIPINE BESYLATE 10 MG/1
10 TABLET ORAL DAILY
Qty: 90 TABLET | Refills: 3 | Status: SHIPPED | OUTPATIENT
Start: 2025-01-06

## 2025-02-24 ENCOUNTER — OFFICE VISIT (OUTPATIENT)
Dept: CARDIOLOGY | Facility: CLINIC | Age: 80
End: 2025-02-24
Payer: MEDICARE

## 2025-02-24 VITALS
HEART RATE: 72 BPM | BODY MASS INDEX: 42.53 KG/M2 | SYSTOLIC BLOOD PRESSURE: 140 MMHG | DIASTOLIC BLOOD PRESSURE: 80 MMHG | WEIGHT: 271 LBS | OXYGEN SATURATION: 99 % | HEIGHT: 67 IN

## 2025-02-24 DIAGNOSIS — R06.02 SHORTNESS OF BREATH: ICD-10-CM

## 2025-02-24 DIAGNOSIS — R73.03 PREDIABETES: ICD-10-CM

## 2025-02-24 DIAGNOSIS — E78.2 MIXED HYPERLIPIDEMIA: ICD-10-CM

## 2025-02-24 DIAGNOSIS — G47.33 OSA (OBSTRUCTIVE SLEEP APNEA): ICD-10-CM

## 2025-02-24 DIAGNOSIS — I10 ESSENTIAL HYPERTENSION: Primary | ICD-10-CM

## 2025-02-24 DIAGNOSIS — R00.2 PALPITATIONS: ICD-10-CM

## 2025-02-24 PROCEDURE — 3077F SYST BP >= 140 MM HG: CPT | Performed by: SPECIALIST

## 2025-02-24 PROCEDURE — 3079F DIAST BP 80-89 MM HG: CPT | Performed by: SPECIALIST

## 2025-02-24 PROCEDURE — 99214 OFFICE O/P EST MOD 30 MIN: CPT | Performed by: SPECIALIST

## 2025-02-24 RX ORDER — ATORVASTATIN CALCIUM 20 MG/1
20 TABLET, FILM COATED ORAL DAILY
Qty: 90 TABLET | Refills: 3 | Status: SHIPPED | OUTPATIENT
Start: 2025-02-24

## 2025-02-24 RX ORDER — AMLODIPINE BESYLATE 10 MG/1
10 TABLET ORAL DAILY
Qty: 90 TABLET | Refills: 3 | Status: SHIPPED | OUTPATIENT
Start: 2025-02-24

## 2025-02-24 RX ORDER — HYDROCHLOROTHIAZIDE 25 MG/1
25 TABLET ORAL DAILY
Qty: 90 TABLET | Refills: 3 | Status: SHIPPED | OUTPATIENT
Start: 2025-02-24

## 2025-02-24 RX ORDER — METOPROLOL TARTRATE 100 MG/1
100 TABLET ORAL 2 TIMES DAILY
Qty: 360 TABLET | Refills: 1 | Status: SHIPPED | OUTPATIENT
Start: 2025-02-24

## 2025-02-24 RX ORDER — LISINOPRIL 40 MG/1
40 TABLET ORAL DAILY
Qty: 90 TABLET | Refills: 1 | Status: SHIPPED | OUTPATIENT
Start: 2025-02-24

## 2025-02-24 NOTE — PROGRESS NOTES
Subjective   Follow up, HTN  Snehal Cutler is a 79 y.o. female who presents to day for Hypertension (Not been checking).    CHIEF COMPLIANT  Chief Complaint   Patient presents with    Hypertension     Not been checking       Active Problems:  Problem List Items Addressed This Visit          Cardiac and Vasculature    Palpitations    Essential hypertension - Primary    Relevant Medications    amLODIPine (NORVASC) 10 MG tablet    hydroCHLOROthiazide 25 MG tablet    lisinopril (PRINIVIL,ZESTRIL) 40 MG tablet    metoprolol tartrate (LOPRESSOR) 100 MG tablet    Mixed hyperlipidemia    Relevant Medications    atorvastatin (LIPITOR) 20 MG tablet    Other Relevant Orders    Lipid Panel    Comprehensive Metabolic Panel       Endocrine and Metabolic    Prediabetes    Relevant Orders    Hemoglobin A1c       Pulmonary and Pneumonias    Shortness of breath       Sleep    TERI (obstructive sleep apnea)       HPI  Hypertension  Pertinent negatives include no chest pain, headaches, palpitations or shortness of breath.       History of Present Illness  The patient presents for evaluation of blood pressure, prediabetes, cholesterol management, and arthritis.    She reports a persistent elevation in her blood pressure, which she does not monitor at home due to perceived inaccuracies in her readings. She is currently on hydrochlorothiazide therapy.    She has not had her blood glucose levels checked recently. She has been experiencing cravings for sweets over the past few months, a new development as she previously had no issues with sugar consumption.    She has been experiencing arthritis in her hands for an extended period, which has recently worsened. She has not yet consulted an orthopedic specialist but has scheduled an appointment with Veronica in May 2025 to discuss potential referrals.    She reports no chest pain and maintains good respiratory function, although she experiences shortness of breath during physical exertion. This  symptom has remained stable and does not pose a significant concern for her. She occasionally experiences edema but does not report any cardiac arrhythmias. She notes that her heart rate increases with physical activity.    Supplemental Information  She is not getting good sleep and is really sleepy right now. She usually wakes up around 3:00 in the morning and stays up till 9 or 10 sometimes later and then takes a couple hours nap. She has not had her nap today.    MEDICATIONS  Current: hydrochlorothiazide       PRIOR MEDS  Current Outpatient Medications on File Prior to Visit   Medication Sig Dispense Refill    doxazosin (CARDURA) 4 MG tablet Take 1 tablet by mouth Every Night.      levothyroxine (SYNTHROID, LEVOTHROID) 88 MCG tablet Daily.      meloxicam (MOBIC) 15 MG tablet Take 1 tablet by mouth Daily.      oxybutynin XL (DITROPAN-XL) 5 MG 24 hr tablet Take 1 tablet by mouth 2 (Two) Times a Day.      [DISCONTINUED] amLODIPine (NORVASC) 10 MG tablet TAKE 1 TABLET BY MOUTH DAILY 90 tablet 3    [DISCONTINUED] atorvastatin (LIPITOR) 20 MG tablet TAKE 1 TABLET DAILY 90 tablet 3    [DISCONTINUED] hydroCHLOROthiazide 25 MG tablet Take 1 tablet by mouth Daily. 90 tablet 3    [DISCONTINUED] lisinopril (PRINIVIL,ZESTRIL) 40 MG tablet Take 1 tablet by mouth Daily. 90 tablet 1    [DISCONTINUED] metoprolol tartrate (LOPRESSOR) 100 MG tablet Take 1 tablet by mouth 2 (Two) Times a Day. 360 tablet 1    [DISCONTINUED] prednisoLONE acetate (PRED FORTE) 1 % ophthalmic suspension AFTER SURGERY USE 1 DROP EVERY 2 HOURS TIL BED. DAY 2 USE 1 DROP 4 TIMES A DAY FOR 14 DAYS, THEN 2 TIMES A DAY FOR 14 (Patient not taking: Reported on 2/24/2025)       No current facility-administered medications on file prior to visit.       ALLERGIES  Morphine    HISTORY  Past Medical History:   Diagnosis Date    Arthritis     HTN (hypertension)     Hyperlipidemia     Hypothyroidism     TERI (obstructive sleep apnea)     does not use machine     "Palpitation     Shortness of breath        Social History     Socioeconomic History    Marital status:    Tobacco Use    Smoking status: Never    Smokeless tobacco: Never   Vaping Use    Vaping status: Never Used   Substance and Sexual Activity    Alcohol use: Never    Drug use: Never       Family History   Problem Relation Age of Onset    Heart attack Mother     Cancer Father        Review of Systems   Constitutional:  Negative for fatigue and fever.   HENT:  Positive for congestion, postnasal drip and rhinorrhea. Negative for sinus pressure and sinus pain.    Respiratory:  Negative for chest tightness and shortness of breath.    Cardiovascular:  Positive for leg swelling. Negative for chest pain and palpitations.   Gastrointestinal:  Positive for constipation.   Genitourinary: Negative.    Neurological:  Negative for dizziness, syncope and headaches.   Hematological:  Does not bruise/bleed easily.   Psychiatric/Behavioral:  Positive for sleep disturbance.        Objective     VITALS: /80 (BP Location: Left arm, Patient Position: Sitting, Cuff Size: Adult)   Pulse 72   Ht 170.2 cm (67.01\")   Wt 123 kg (271 lb)   SpO2 99%   BMI 42.43 kg/m²     LABS:   Lab Results (most recent)       None            IMAGING:   No Images in the past 120 days found..    EXAM:  Physical Exam  Vitals reviewed.   Constitutional:       Appearance: She is well-developed.   HENT:      Head: Normocephalic and atraumatic.   Eyes:      Pupils: Pupils are equal, round, and reactive to light.   Neck:      Thyroid: No thyromegaly.      Vascular: No JVD.   Cardiovascular:      Rate and Rhythm: Normal rate and regular rhythm.      Heart sounds: Normal heart sounds. No murmur heard.     No friction rub. No gallop.   Pulmonary:      Effort: Pulmonary effort is normal. No respiratory distress.      Breath sounds: Normal breath sounds. No stridor. No wheezing or rales.   Chest:      Chest wall: No tenderness.   Musculoskeletal:         " General: No tenderness or deformity.      Cervical back: Neck supple.   Skin:     General: Skin is warm and dry.   Neurological:      Mental Status: She is alert and oriented to person, place, and time.      Cranial Nerves: No cranial nerve deficit.      Coordination: Coordination normal.       Physical Exam  Lungs were auscultated.  Heart was examined.    Vital Signs  Blood pressure is 140/80.       Procedure   Procedures      Results  Laboratory Studies  Hemoglobin A1c was 5.9. LDL cholesterol is 99, HDL cholesterol is 48, triglycerides is 128. Fasting sugar was 107. Kidney and liver function tests were normal.       Assessment & Plan     Diagnoses and all orders for this visit:    1. Essential hypertension (Primary)  -     amLODIPine (NORVASC) 10 MG tablet; Take 1 tablet by mouth Daily.  Dispense: 90 tablet; Refill: 3  -     hydroCHLOROthiazide 25 MG tablet; Take 1 tablet by mouth Daily.  Dispense: 90 tablet; Refill: 3  -     lisinopril (PRINIVIL,ZESTRIL) 40 MG tablet; Take 1 tablet by mouth Daily.  Dispense: 90 tablet; Refill: 1  -     metoprolol tartrate (LOPRESSOR) 100 MG tablet; Take 1 tablet by mouth 2 (Two) Times a Day.  Dispense: 360 tablet; Refill: 1    2. Mixed hyperlipidemia  -     atorvastatin (LIPITOR) 20 MG tablet; Take 1 tablet by mouth Daily.  Dispense: 90 tablet; Refill: 3  -     Lipid Panel; Future  -     Comprehensive Metabolic Panel; Future    3. Prediabetes  -     Hemoglobin A1c; Future    4. Shortness of breath    5. Palpitations    6. TERI (obstructive sleep apnea)      Assessment & Plan  1. Hypertension.  Her blood pressure is slightly elevated at 140/80. She is advised to purchase an electronic arm blood pressure monitor from a pharmacy and record her blood pressure once or twice a week. She should contact the office if her blood pressure readings exceed 140 systolic or 80 diastolic. A prescription for hydrochlorothiazide will be sent to her pharmacy, [facility].    2.  Prediabetes.  Her hemoglobin A1c was 5.9 in November 2024, indicating prediabetes. Her fasting blood glucose level was also elevated at 107. She is advised to reduce carbohydrate intake, engage in regular exercise, and aim for weight loss. A lab order for cholesterol and blood glucose tests will be provided for completion prior to her next visit.    3. Cholesterol management.  Her cholesterol levels are within acceptable ranges, with LDL at 99, HDL at 48, and triglycerides at 128. A lab order for cholesterol and blood glucose tests will be provided for completion prior to her next visit.    4. Arthritis.  She reports worsening arthritis in her hands and a new issue with her finger. She has an appointment with Veronica in May 2025 and will discuss a referral to an orthopedic specialist.    5. Shortness of breath.  She reports experiencing shortness of breath with exertion, but it is not worse than before. No changes in management are necessary at this time.    Follow-up  The patient will follow up in 6 months.       Return in about 6 months (around 8/24/2025).                   MEDS ORDERED DURING VISIT:  New Medications Ordered This Visit   Medications    amLODIPine (NORVASC) 10 MG tablet     Sig: Take 1 tablet by mouth Daily.     Dispense:  90 tablet     Refill:  3    atorvastatin (LIPITOR) 20 MG tablet     Sig: Take 1 tablet by mouth Daily.     Dispense:  90 tablet     Refill:  3    hydroCHLOROthiazide 25 MG tablet     Sig: Take 1 tablet by mouth Daily.     Dispense:  90 tablet     Refill:  3    lisinopril (PRINIVIL,ZESTRIL) 40 MG tablet     Sig: Take 1 tablet by mouth Daily.     Dispense:  90 tablet     Refill:  1    metoprolol tartrate (LOPRESSOR) 100 MG tablet     Sig: Take 1 tablet by mouth 2 (Two) Times a Day.     Dispense:  360 tablet     Refill:  1         As always, Veronica Kellogg, NARGIS  I appreciate very much the opportunity to participate in the cardiovascular care of your patients. Please do not hesitate  to call me with any questions with regards to Snehal Cutler evaluation and management.     Patient or patient representative verbalized consent for the use of Ambient Listening during the visit with  Nabeel Turner MD for chart documentation. 2/24/2025  15:13 EST       This document has been electronically signed by Nabeel Turner MD  February 24, 2025 15:18 EST    This note is dictated utilizing voice recognition software.

## 2025-04-14 NOTE — TELEPHONE ENCOUNTER
Pharmacy did not receive Rx for HCTZ, resent Rx to pharmacy as requested.   Pt needs to do this dose for at least 2 weeks before changing the dose. He can update us next week.